# Patient Record
Sex: MALE | Race: WHITE | Employment: FULL TIME | URBAN - METROPOLITAN AREA
[De-identification: names, ages, dates, MRNs, and addresses within clinical notes are randomized per-mention and may not be internally consistent; named-entity substitution may affect disease eponyms.]

---

## 2018-06-29 ENCOUNTER — HOSPITAL ENCOUNTER (OUTPATIENT)
Dept: GENERAL RADIOLOGY | Age: 48
Discharge: HOME | End: 2018-07-01
Payer: COMMERCIAL

## 2018-06-29 ENCOUNTER — OFFICE VISIT (OUTPATIENT)
Dept: FAMILY MEDICINE CLINIC | Age: 48
End: 2018-06-29
Payer: COMMERCIAL

## 2018-06-29 DIAGNOSIS — M25.532 WRIST PAIN, LEFT: ICD-10-CM

## 2018-06-29 DIAGNOSIS — M79.642 HAND PAIN, LEFT: ICD-10-CM

## 2018-06-29 DIAGNOSIS — M75.41 IMPINGEMENT SYNDROME OF RIGHT SHOULDER: ICD-10-CM

## 2018-06-29 DIAGNOSIS — E55.9 VITAMIN D DEFICIENCY: ICD-10-CM

## 2018-06-29 DIAGNOSIS — M25.562 CHRONIC PAIN OF LEFT KNEE: Primary | ICD-10-CM

## 2018-06-29 DIAGNOSIS — G47.33 OSA (OBSTRUCTIVE SLEEP APNEA): ICD-10-CM

## 2018-06-29 DIAGNOSIS — E78.5 HYPERLIPIDEMIA WITH TARGET LDL LESS THAN 130: ICD-10-CM

## 2018-06-29 DIAGNOSIS — G89.29 CHRONIC PAIN OF LEFT KNEE: Primary | ICD-10-CM

## 2018-06-29 DIAGNOSIS — Z72.0 TOBACCO ABUSE: ICD-10-CM

## 2018-06-29 PROCEDURE — 99213 OFFICE O/P EST LOW 20 MIN: CPT | Performed by: FAMILY MEDICINE

## 2018-06-29 PROCEDURE — 73110 X-RAY EXAM OF WRIST: CPT

## 2018-06-29 PROCEDURE — 73130 X-RAY EXAM OF HAND: CPT

## 2018-06-29 RX ORDER — MELOXICAM 7.5 MG/1
7.5 TABLET ORAL DAILY
Qty: 30 TABLET | Refills: 3 | Status: SHIPPED | OUTPATIENT
Start: 2018-06-29 | End: 2019-02-25 | Stop reason: ALTCHOICE

## 2018-06-29 ASSESSMENT — PATIENT HEALTH QUESTIONNAIRE - PHQ9
1. LITTLE INTEREST OR PLEASURE IN DOING THINGS: 0
SUM OF ALL RESPONSES TO PHQ QUESTIONS 1-9: 0
SUM OF ALL RESPONSES TO PHQ9 QUESTIONS 1 & 2: 0
2. FEELING DOWN, DEPRESSED OR HOPELESS: 0

## 2018-07-06 DIAGNOSIS — T14.8XXA FRACTURE: Primary | ICD-10-CM

## 2018-07-10 VITALS
RESPIRATION RATE: 18 BRPM | DIASTOLIC BLOOD PRESSURE: 85 MMHG | TEMPERATURE: 97.3 F | WEIGHT: 201 LBS | HEIGHT: 71 IN | SYSTOLIC BLOOD PRESSURE: 135 MMHG | BODY MASS INDEX: 28.14 KG/M2 | HEART RATE: 79 BPM | OXYGEN SATURATION: 97 %

## 2018-07-10 NOTE — PROGRESS NOTES
hematuria  Musculoskeletal ROS: Pain  Neurological ROS: no TIA or stroke symptoms  Dermatological ROS: negative    Blood pressure 135/85, pulse 79, temperature 97.3 °F (36.3 °C), temperature source Temporal, resp. rate 18, height 5' 11\" (1.803 m), weight 201 lb (91.2 kg), SpO2 97 %. Physical Examination: General appearance - alert, well appearing, and in no distress  Mental status - alert, oriented to person, place, and time  Eyes - pupils equal and reactive, extraocular eye movements intact  Ears - bilateral TM's and external ear canals normal  Mouth - mucous membranes moist, pharynx normal without lesions  Neck - supple, no significant adenopathy  Lymphatics - no palpable lymphadenopathy, no hepatosplenomegaly  Chest - clear to auscultation, no wheezes, rales or rhonchi, symmetric air entry  Heart - normal rate, regular rhythm, normal S1, S2, no murmurs, rubs, clicks or gallops  Abdomen - soft, nontender, nondistended, no masses or organomegaly  Neurological - alert, oriented, normal speech, no focal findings or movement disorder noted  Musculoskeletal - no joint tenderness, deformity or swelling  Extremities - peripheral pulses normal, no pedal edema, no clubbing or cyanosis  Skin - normal coloration and turgor, no rashes, no suspicious skin lesions noted; Left wrist and hand x-ray noted with some degenerative changes    Assessment:     Diagnosis Orders   1. Chronic pain of left knee     2. Hyperlipidemia with target LDL less than 130     3. CRISTIAN (obstructive sleep apnea)     4. Tobacco abuse     5. Impingement syndrome of right shoulder     6. Vitamin D deficiency     7. Wrist pain, left  XR WRIST LEFT (MIN 3 VIEWS)   8.  Hand pain, left  XR HAND LEFT (MIN 3 VIEWS)       Plan:    Orders Placed This Encounter   Procedures    XR HAND LEFT (MIN 3 VIEWS)     Standing Status:   Future     Number of Occurrences:   1     Standing Expiration Date:   6/29/2019    XR WRIST LEFT (MIN 3 VIEWS)     Standing Status: Future     Number of Occurrences:   1     Standing Expiration Date:   6/29/2019       Outpatient Encounter Prescriptions as of 6/29/2018   Medication Sig Dispense Refill    meloxicam (MOBIC) 7.5 MG tablet Take 1 tablet by mouth daily 30 tablet 3    VOLTAREN 1 % GEL APPLY 4 GM  TO AFFECTED AREA(S)  FOUR TIMES DAILY 500 g 2    [DISCONTINUED] meloxicam (MOBIC) 15 MG tablet TAKE ONE TABLET BY MOUTH ONCE DAILY 30 tablet 2    [DISCONTINUED] meloxicam (MOBIC) 15 MG tablet TAKE ONE TABLET BY MOUTH EVERY DAY 30 tablet 3    [DISCONTINUED] clotrimazole-betamethasone (LOTRISONE) 1-0.05 % cream APPLY TOPICALLY TWICE DAILY 45 g 3    [DISCONTINUED] diclofenac sodium (VOLTAREN) 1 % GEL Apply 2 g topically 2 times daily. 1 Tube 3     No facility-administered encounter medications on file as of 6/29/2018. Keep ortho eval Regarding continued pain or if x-ray reading is abnormal  Stop tob  Watch bp  Recommend lifestyle modification. Discuss nsaid risk    Return if symptoms worsen or fail to improve. Patient education provided. They understand and agree with this course of treatment. They will return with new or worsening symptoms. Patient instructed to remain current with appropriate annual health maintenance.

## 2018-07-25 ENCOUNTER — HOSPITAL ENCOUNTER (OUTPATIENT)
Dept: GENERAL RADIOLOGY | Age: 48
Discharge: HOME | End: 2018-07-25

## 2018-07-25 DIAGNOSIS — S61.204A UNSPECIFIED OPEN WOUND OF RIGHT RING FINGER WITHOUT DAMAGE TO NAIL, INITIAL ENCOUNTER: ICD-10-CM

## 2019-02-25 ENCOUNTER — OFFICE VISIT (OUTPATIENT)
Dept: FAMILY MEDICINE CLINIC | Age: 49
End: 2019-02-25
Payer: COMMERCIAL

## 2019-02-25 DIAGNOSIS — G47.33 OSA (OBSTRUCTIVE SLEEP APNEA): ICD-10-CM

## 2019-02-25 DIAGNOSIS — E55.9 VITAMIN D DEFICIENCY: ICD-10-CM

## 2019-02-25 DIAGNOSIS — Z72.0 TOBACCO ABUSE: ICD-10-CM

## 2019-02-25 DIAGNOSIS — M25.532 WRIST PAIN, LEFT: ICD-10-CM

## 2019-02-25 DIAGNOSIS — G44.209 TENSION HEADACHE: ICD-10-CM

## 2019-02-25 DIAGNOSIS — E78.5 HYPERLIPIDEMIA WITH TARGET LDL LESS THAN 130: ICD-10-CM

## 2019-02-25 DIAGNOSIS — M75.41 IMPINGEMENT SYNDROME OF RIGHT SHOULDER: Primary | ICD-10-CM

## 2019-02-25 PROCEDURE — 99214 OFFICE O/P EST MOD 30 MIN: CPT | Performed by: FAMILY MEDICINE

## 2019-02-25 RX ORDER — NAPROXEN 500 MG/1
500 TABLET ORAL 2 TIMES DAILY WITH MEALS
Qty: 60 TABLET | Refills: 3 | Status: SHIPPED | OUTPATIENT
Start: 2019-02-25

## 2019-02-25 ASSESSMENT — PATIENT HEALTH QUESTIONNAIRE - PHQ9
1. LITTLE INTEREST OR PLEASURE IN DOING THINGS: 0
SUM OF ALL RESPONSES TO PHQ QUESTIONS 1-9: 0
SUM OF ALL RESPONSES TO PHQ QUESTIONS 1-9: 0
SUM OF ALL RESPONSES TO PHQ9 QUESTIONS 1 & 2: 0
2. FEELING DOWN, DEPRESSED OR HOPELESS: 0

## 2019-02-26 VITALS
RESPIRATION RATE: 16 BRPM | DIASTOLIC BLOOD PRESSURE: 80 MMHG | TEMPERATURE: 98.4 F | BODY MASS INDEX: 30.38 KG/M2 | HEIGHT: 71 IN | WEIGHT: 217 LBS | HEART RATE: 72 BPM | SYSTOLIC BLOOD PRESSURE: 135 MMHG

## 2019-03-25 ENCOUNTER — OFFICE VISIT (OUTPATIENT)
Dept: FAMILY MEDICINE CLINIC | Age: 49
End: 2019-03-25
Payer: COMMERCIAL

## 2019-03-25 VITALS
HEART RATE: 96 BPM | TEMPERATURE: 98.2 F | HEIGHT: 71 IN | OXYGEN SATURATION: 98 % | RESPIRATION RATE: 16 BRPM | WEIGHT: 217 LBS | BODY MASS INDEX: 30.38 KG/M2 | DIASTOLIC BLOOD PRESSURE: 84 MMHG | SYSTOLIC BLOOD PRESSURE: 140 MMHG

## 2019-03-25 DIAGNOSIS — G89.29 CHRONIC RIGHT SHOULDER PAIN: ICD-10-CM

## 2019-03-25 DIAGNOSIS — E78.5 HYPERLIPIDEMIA WITH TARGET LDL LESS THAN 130: Primary | ICD-10-CM

## 2019-03-25 DIAGNOSIS — E55.9 VITAMIN D DEFICIENCY: ICD-10-CM

## 2019-03-25 DIAGNOSIS — M25.511 CHRONIC RIGHT SHOULDER PAIN: ICD-10-CM

## 2019-03-25 PROCEDURE — 99213 OFFICE O/P EST LOW 20 MIN: CPT | Performed by: FAMILY MEDICINE

## 2019-04-02 ENCOUNTER — TELEPHONE (OUTPATIENT)
Dept: FAMILY MEDICINE CLINIC | Age: 49
End: 2019-04-02

## 2022-04-12 ENCOUNTER — HOSPITAL ENCOUNTER (EMERGENCY)
Age: 52
Discharge: HOME | End: 2022-04-12
Attending: EMERGENCY MEDICINE
Payer: COMMERCIAL

## 2022-04-12 ENCOUNTER — APPOINTMENT (OUTPATIENT)
Dept: CT IMAGING | Age: 52
End: 2022-04-12
Payer: COMMERCIAL

## 2022-04-12 VITALS
DIASTOLIC BLOOD PRESSURE: 95 MMHG | RESPIRATION RATE: 18 BRPM | HEIGHT: 72 IN | OXYGEN SATURATION: 97 % | WEIGHT: 212 LBS | SYSTOLIC BLOOD PRESSURE: 170 MMHG | HEART RATE: 104 BPM | TEMPERATURE: 98.1 F | BODY MASS INDEX: 28.71 KG/M2

## 2022-04-12 DIAGNOSIS — S09.90XA CLOSED HEAD INJURY, INITIAL ENCOUNTER: Primary | ICD-10-CM

## 2022-04-12 PROCEDURE — 99283 EMERGENCY DEPT VISIT LOW MDM: CPT

## 2022-04-12 PROCEDURE — 70450 CT HEAD/BRAIN W/O DYE: CPT

## 2022-04-12 ASSESSMENT — PAIN SCALES - GENERAL
PAINLEVEL_OUTOF10: 7
PAINLEVEL_OUTOF10: 5

## 2022-04-12 ASSESSMENT — PAIN DESCRIPTION - LOCATION
LOCATION: HEAD
LOCATION: HEAD

## 2022-04-12 ASSESSMENT — PAIN DESCRIPTION - DESCRIPTORS
DESCRIPTORS: ACHING
DESCRIPTORS: ACHING

## 2022-04-12 ASSESSMENT — PAIN DESCRIPTION - FREQUENCY: FREQUENCY: CONTINUOUS

## 2022-04-12 ASSESSMENT — PAIN SCALES - WONG BAKER: WONGBAKER_NUMERICALRESPONSE: 0

## 2022-04-12 ASSESSMENT — PAIN DESCRIPTION - PAIN TYPE
TYPE: ACUTE PAIN
TYPE: ACUTE PAIN

## 2022-04-12 ASSESSMENT — PAIN - FUNCTIONAL ASSESSMENT: PAIN_FUNCTIONAL_ASSESSMENT: 0-10

## 2022-04-12 NOTE — ED TRIAGE NOTES
Pt. Was injured at work by a metal band that circles steel, he states it hit his forehead, his nose. He denies LOC, reports some dizziness, mild nausea. Denies neck pain. He is unsure of blurred vision secondary to he \"needs new glasses,\"  There is no obvious swelling or injury to his face or head. He is a&o x4, NORTH.

## 2022-04-12 NOTE — ED NOTES
Rob Martin and Supervisor Ciara Mack both called regarding drug testing. Pt was injured 04/08/2022. Pt is past any time frame for drug testing per RN Supervisor. Pt advised.      Yahaira Phillips RN  04/12/22 [id number]

## 2022-04-12 NOTE — ED PROVIDER NOTES
CC/HPI: 54-year-old male that states while at work he was on doing a metal band when the metal band and the beams that they were holding came loose and struck him on his forehead. Patient states this happened last Thursday. The headache is gotten steadily worse. States he has mild nausea and some dizziness. Unsure of blurred vision secondary to he states he is needed new glasses for a while. No paresthesias no neck pain no chest pain or shortness of breath      VITALS/PMH/PSH: Reviewed per nurses notes    REVIEW OF SYSTEMS: As in chief complaint history of present illness, otherwise all other systems are reviewed and negative the total 10 systems reviewed    PHYSICAL EXAM:  GEN: Pt alert and oriented, no acute distress  HEENT:         Normocephalic/Atramatic -no outward signs of trauma to his forehead       PERRL, EOMI       EACs and TMs clear b/l no hemotympanums       Throat non-edematous. No erythema noted. No exudates noted. Moist membranes  NECK: Nontender, no signs of trauma, no lymphadenopathy  HEART: Reg S1/S2, without murmer, rub or gallop  LUNGS: Clear to auscultation bilaterally, respirations even and unlabored  MUSCULOSKELETAL/EXTREMITITES:  No signs of trauma, cyanosis or edema. LYMPH: no peripheral lympadenopathy noted  SKIN:  Warm & dry, no rash  NEUROLOGIC:  Alert and oriented. Speech clear. Cranial nerves II through XII grossly intact as are strength and sensation no focal or cerebellar deficits    Medical decision making/ED course;  Patient main stable throughout the course of his emergency department stay. CT scan of the head was obtained and interpreted by radiologist as showing no signs of acute intracranial abnormality. Clinical impression;  1) closed head injury    Disposition/plan;   Patient discharged home in stable condition given discharge instructions on head injury. Patient is to follow-up with occupational health.   Patient is to return for worsening or changes to

## 2024-08-30 PROBLEM — G44.209 TENSION HEADACHE: Status: ACTIVE | Noted: 2019-02-25

## 2024-08-30 PROBLEM — M25.511 CHRONIC RIGHT SHOULDER PAIN: Status: ACTIVE | Noted: 2019-03-25

## 2024-08-30 PROBLEM — S62.009A SCAPHOID FRACTURE, WRIST, CLOSED: Status: ACTIVE | Noted: 2024-08-30

## 2024-08-30 PROBLEM — S83.412A SPRAIN OF MEDIAL COLLATERAL LIGAMENT OF LEFT KNEE: Status: ACTIVE | Noted: 2024-08-30

## 2024-08-30 PROBLEM — M17.10 ARTHRITIS OF KNEE: Status: ACTIVE | Noted: 2024-08-30

## 2024-08-30 PROBLEM — G89.29 CHRONIC RIGHT SHOULDER PAIN: Status: ACTIVE | Noted: 2019-03-25

## 2024-08-30 RX ORDER — CELECOXIB 200 MG/1
CAPSULE ORAL 2 TIMES DAILY
COMMUNITY
End: 2024-09-05 | Stop reason: ALTCHOICE

## 2024-08-30 RX ORDER — MELOXICAM 15 MG/1
1 TABLET ORAL DAILY
COMMUNITY
Start: 2021-08-24 | End: 2024-09-05 | Stop reason: SDUPTHER

## 2024-09-05 ENCOUNTER — APPOINTMENT (OUTPATIENT)
Dept: PRIMARY CARE | Facility: CLINIC | Age: 54
End: 2024-09-05
Payer: COMMERCIAL

## 2024-09-05 VITALS
HEART RATE: 102 BPM | SYSTOLIC BLOOD PRESSURE: 146 MMHG | BODY MASS INDEX: 30.43 KG/M2 | DIASTOLIC BLOOD PRESSURE: 94 MMHG | OXYGEN SATURATION: 96 % | TEMPERATURE: 98.1 F | HEIGHT: 73 IN | RESPIRATION RATE: 16 BRPM | WEIGHT: 229.6 LBS

## 2024-09-05 DIAGNOSIS — M25.562 CHRONIC PAIN OF LEFT KNEE: ICD-10-CM

## 2024-09-05 DIAGNOSIS — G89.29 CHRONIC PAIN OF LEFT KNEE: ICD-10-CM

## 2024-09-05 DIAGNOSIS — M17.12 OSTEOARTHRITIS OF LEFT KNEE, UNSPECIFIED OSTEOARTHRITIS TYPE: Primary | ICD-10-CM

## 2024-09-05 DIAGNOSIS — R03.0 TEMPORARY HIGH BLOOD PRESSURE: ICD-10-CM

## 2024-09-05 DIAGNOSIS — E78.5 HYPERLIPIDEMIA WITH TARGET LDL LESS THAN 130: ICD-10-CM

## 2024-09-05 PROCEDURE — 3080F DIAST BP >= 90 MM HG: CPT | Performed by: FAMILY MEDICINE

## 2024-09-05 PROCEDURE — 3077F SYST BP >= 140 MM HG: CPT | Performed by: FAMILY MEDICINE

## 2024-09-05 PROCEDURE — 3008F BODY MASS INDEX DOCD: CPT | Performed by: FAMILY MEDICINE

## 2024-09-05 PROCEDURE — 99214 OFFICE O/P EST MOD 30 MIN: CPT | Performed by: FAMILY MEDICINE

## 2024-09-05 RX ORDER — CELECOXIB 200 MG/1
200 CAPSULE ORAL DAILY
Qty: 90 CAPSULE | Refills: 1 | Status: CANCELLED | OUTPATIENT
Start: 2024-09-05

## 2024-09-05 RX ORDER — MELOXICAM 15 MG/1
15 TABLET ORAL DAILY
Qty: 90 TABLET | Refills: 1 | Status: SHIPPED | OUTPATIENT
Start: 2024-09-05

## 2024-09-05 ASSESSMENT — PATIENT HEALTH QUESTIONNAIRE - PHQ9
SUM OF ALL RESPONSES TO PHQ9 QUESTIONS 1 AND 2: 0
1. LITTLE INTEREST OR PLEASURE IN DOING THINGS: NOT AT ALL
2. FEELING DOWN, DEPRESSED OR HOPELESS: NOT AT ALL

## 2024-09-05 ASSESSMENT — ENCOUNTER SYMPTOMS
LOSS OF SENSATION IN FEET: 0
OCCASIONAL FEELINGS OF UNSTEADINESS: 0
DEPRESSION: 0

## 2024-09-05 NOTE — PROGRESS NOTES
"Subjective   Patient ID:  Rashaun Klein is a 53 y.o. male patient who presents today for Hyperlipidemia and Arthritis (Left knee pain)    Hyperlipidemia: States his clinic at work has his previous blood work.     Arthritis: Patient complains of arthritis pain in the left knee. The pain has been going on for awhile now. Has not taken antiinflammatory medication. He has previously taken antiinflammatory medications meloxicam and Celebrex. Patient stated the meloxicam worked best previously. He has been using Voltaren gel at home.     Blood pressure is high today in clinic. He states this is not usually normal, but has recently been having a short fuse due to previous medication.       The patient denies having the following symptoms: chest pain, chest pressure, fever, chills, N/V/D, constipation, dizziness, headaches, SOB.      Objective   Vitals:  BP (!) 146/94   Pulse 102   Temp 36.7 °C (98.1 °F)   Resp 16   Ht 1.854 m (6' 1\")   Wt 104 kg (229 lb 9.6 oz)   SpO2 96%   BMI 30.29 kg/m²     Physical Exam  Vitals reviewed.   Constitutional:       Appearance: Normal appearance.   Neck:      Vascular: No carotid bruit.   Cardiovascular:      Rate and Rhythm: Normal rate and regular rhythm.      Pulses: Normal pulses.      Heart sounds: Normal heart sounds.   Pulmonary:      Effort: Pulmonary effort is normal. No respiratory distress.      Breath sounds: Normal breath sounds. No wheezing.   Abdominal:      General: There is no distension.      Palpations: Abdomen is soft. There is no mass.      Tenderness: There is no abdominal tenderness. There is no right CVA tenderness, left CVA tenderness, guarding or rebound.   Musculoskeletal:      Cervical back: Normal range of motion and neck supple. No rigidity.      Right lower leg: No edema.      Left lower leg: No edema.      Comments: Left knee is tender and in a brace.    Lymphadenopathy:      Cervical: No cervical adenopathy.   Neurological:      Mental Status: He is " alert.       Reviewed labs from employer sponsored exam.      Assessment/Plan   Problem List Items Addressed This Visit       Hyperlipidemia with target LDL less than 130    Current Assessment & Plan     Continue eating healthy, will continue following with them for your lab testing.         Relevant Orders    Lipid Panel    Knee pain, left    Current Assessment & Plan     Likely due to arthritis, see left knee arthritis.         Osteoarthritis of left knee - Primary    Current Assessment & Plan     This condition is present and symptomatic, will need treatment.  Meloxicam 15 mg daily.  Patient may use Tylenol if he needs it.  Patient should not take celecoxib because he is on meloxicam.  Aleve and ibuprofen should not be used either.         Relevant Medications    meloxicam (Mobic) 15 mg tablet     Other Visit Diagnoses       Temporary high blood pressure        Blood pressure elevated in office today, patient will monitor blood pressure and see MA in near future for blood pressure recheck.    Relevant Orders    Follow Up In [facility] - PCP - Established    CBC and Auto Differential    Comprehensive Metabolic Panel    Magnesium                Follow up in: One month or sooner if needed with labs today.    Scribe Attestation  By signing my name below, IIda Scribe   attest that this documentation has been prepared under the direction and in the presence of Chaim Chow MD.

## 2024-09-08 PROBLEM — M17.12 OSTEOARTHRITIS OF LEFT KNEE: Status: ACTIVE | Noted: 2024-09-08

## 2024-09-08 NOTE — ASSESSMENT & PLAN NOTE
This condition is present and symptomatic, will need treatment.  Meloxicam 15 mg daily.  Patient may use Tylenol if he needs it.  Patient should not take celecoxib because he is on meloxicam.  Aleve and ibuprofen should not be used either.

## 2024-10-08 ENCOUNTER — APPOINTMENT (OUTPATIENT)
Dept: PRIMARY CARE | Facility: CLINIC | Age: 54
End: 2024-10-08
Payer: COMMERCIAL

## 2024-10-08 VITALS
HEART RATE: 99 BPM | DIASTOLIC BLOOD PRESSURE: 84 MMHG | SYSTOLIC BLOOD PRESSURE: 138 MMHG | TEMPERATURE: 97.4 F | OXYGEN SATURATION: 97 % | WEIGHT: 234.2 LBS | RESPIRATION RATE: 16 BRPM | HEIGHT: 73 IN | BODY MASS INDEX: 31.04 KG/M2

## 2024-10-08 DIAGNOSIS — I10 PRIMARY HYPERTENSION: Primary | ICD-10-CM

## 2024-10-08 DIAGNOSIS — M19.90 ARTHRITIS: ICD-10-CM

## 2024-10-08 PROCEDURE — 3075F SYST BP GE 130 - 139MM HG: CPT | Performed by: FAMILY MEDICINE

## 2024-10-08 PROCEDURE — 3079F DIAST BP 80-89 MM HG: CPT | Performed by: FAMILY MEDICINE

## 2024-10-08 PROCEDURE — 3008F BODY MASS INDEX DOCD: CPT | Performed by: FAMILY MEDICINE

## 2024-10-08 PROCEDURE — 99213 OFFICE O/P EST LOW 20 MIN: CPT | Performed by: FAMILY MEDICINE

## 2024-10-08 RX ORDER — HYDROCHLOROTHIAZIDE 12.5 MG/1
12.5 TABLET ORAL DAILY
Qty: 30 TABLET | Refills: 3 | Status: SHIPPED | OUTPATIENT
Start: 2024-10-08 | End: 2025-02-05

## 2024-10-08 ASSESSMENT — ENCOUNTER SYMPTOMS
OCCASIONAL FEELINGS OF UNSTEADINESS: 0
DEPRESSION: 0
LOSS OF SENSATION IN FEET: 0

## 2024-10-08 NOTE — PROGRESS NOTES
"Subjective   Patient ID:  Rashaun Klein is a 53 y.o. male patient who presents today for Hypertension (Pt was taking Lisinopril and it made him angry. He has stopped taking it. He is agreeable he needs a medication.), Osteoarthritis, and Obesity    Hypertension: blood pressure today is elevated. He discontinued Lisinopril due to experiencing increased irritability while on the medication.  The patient's tongue swelled up and emergency room thought that it was quite possibly due to lisinopril.  Prescribed hydrochlorothiazide 12.5 mg. He is advised to monitor blood pressure at home.     Osteoarthritis: Patient is moving around a lot better with less joint pain.  Meloxicam seems to be helping considerably.      The patient denies having the following symptoms: chest pain, chest pressure, fever, chills, N/V/D, constipation, dizziness, headaches, SOB.    Objective   Vitals:  /84   Pulse 99   Temp 36.3 °C (97.4 °F)   Resp 16   Ht 1.854 m (6' 1\")   Wt 106 kg (234 lb 3.2 oz)   SpO2 97%   BMI 30.90 kg/m²     Physical Exam  Vitals reviewed.   Constitutional:       Appearance: Normal appearance.   Cardiovascular:      Rate and Rhythm: Normal rate and regular rhythm.      Pulses: Normal pulses.      Heart sounds: Normal heart sounds.   Pulmonary:      Effort: Pulmonary effort is normal.      Breath sounds: Normal breath sounds.   Abdominal:      General: Abdomen is flat.      Palpations: Abdomen is soft.   Musculoskeletal:      Cervical back: Normal range of motion and neck supple.   Neurological:      Mental Status: He is alert.             Assessment/Plan   Problem List Items Addressed This Visit       Primary hypertension - Primary    Current Assessment & Plan     Present, will need to change therapy as lisinopril just to be discontinued, start hydrochlorothiazide 12.5 mg daily.         Relevant Medications    hydroCHLOROthiazide (Microzide) 12.5 mg tablet    Arthritis    Current Assessment & Plan     This " condition is well controlled, continue with current medications.  Continue with meloxicam 15 mg daily.                Follow up in: 3 months or sooner if needed without labs prior. Patient will bring blood work from employer.    Scribe Attestation  By signing my name below, ILeonor , Scribe attest that this documentation has been prepared under the direction and in the presence of Chaim Chow MD.

## 2024-10-09 NOTE — ASSESSMENT & PLAN NOTE
Present, will need to change therapy as lisinopril just to be discontinued, start hydrochlorothiazide 12.5 mg daily.

## 2024-10-09 NOTE — ASSESSMENT & PLAN NOTE
This condition is well controlled, continue with current medications.  Continue with meloxicam 15 mg daily.

## 2024-12-03 ENCOUNTER — TELEPHONE (OUTPATIENT)
Dept: PRIMARY CARE | Facility: CLINIC | Age: 54
End: 2024-12-03
Payer: COMMERCIAL

## 2024-12-03 DIAGNOSIS — M17.12 OSTEOARTHRITIS OF LEFT KNEE, UNSPECIFIED OSTEOARTHRITIS TYPE: ICD-10-CM

## 2024-12-03 NOTE — TELEPHONE ENCOUNTER
Received multiple faxes from [facility] requesting the patients meloxicam. Pharmacy not listed in patients chart. LVM asking patient to call back and confirm

## 2024-12-03 NOTE — TELEPHONE ENCOUNTER
GAY CALLED IN TO LET US KNOW THAT HE DID HAVE TO HAVE HIS MEDICATIONS SWITCHED TO A MAIL ORDER FOR THEM TO BE COVERED BUT HE STATED THAT HE ALREADY PAID FOR HIS MEDICATIONS AND THEY SHOULD BE IN THE MAIL ALREADY. DID SAY THE NAME OF HIS PHARMACY IS [facility]. NOT SURE IF ITS AFFIL[facility]ATED [facility] PLEASE AD[facility]ISE?     [facility]   [phone]

## 2024-12-04 RX ORDER — MELOXICAM 15 MG/1
15 TABLET ORAL DAILY
Qty: 90 TABLET | Refills: 1 | Status: SHIPPED | OUTPATIENT
Start: 2024-12-04

## 2024-12-04 NOTE — TELEPHONE ENCOUNTER
The pharmacy is called [facility] and it is associated with [facility]. Chart update with pharmacy

## 2025-01-09 ENCOUNTER — APPOINTMENT (OUTPATIENT)
Dept: PRIMARY CARE | Facility: CLINIC | Age: 55
End: 2025-01-09
Payer: COMMERCIAL

## 2025-01-09 VITALS
WEIGHT: 234 LBS | TEMPERATURE: 97.4 F | DIASTOLIC BLOOD PRESSURE: 90 MMHG | HEART RATE: 104 BPM | BODY MASS INDEX: 30.87 KG/M2 | RESPIRATION RATE: 16 BRPM | OXYGEN SATURATION: 97 % | SYSTOLIC BLOOD PRESSURE: 160 MMHG

## 2025-01-09 DIAGNOSIS — M17.32 POST-TRAUMATIC OSTEOARTHRITIS OF LEFT KNEE: ICD-10-CM

## 2025-01-09 DIAGNOSIS — I10 PRIMARY HYPERTENSION: Primary | ICD-10-CM

## 2025-01-09 PROCEDURE — 3077F SYST BP >= 140 MM HG: CPT | Performed by: FAMILY MEDICINE

## 2025-01-09 PROCEDURE — 99214 OFFICE O/P EST MOD 30 MIN: CPT | Performed by: FAMILY MEDICINE

## 2025-01-09 PROCEDURE — 3080F DIAST BP >= 90 MM HG: CPT | Performed by: FAMILY MEDICINE

## 2025-01-09 RX ORDER — AMLODIPINE BESYLATE 5 MG/1
5 TABLET ORAL DAILY
Qty: 30 TABLET | Refills: 5 | Status: SHIPPED | OUTPATIENT
Start: 2025-01-09 | End: 2025-07-08

## 2025-01-09 NOTE — PROGRESS NOTES
Subjective    Patient ID: Rashaun Klein is a 54 y.o. male who presents for Hypertension, Hyperlipidemia, oseoarthritis, and Obesity.     Hypertension: The patient is here for an evaluation of elevated blood pressure. The patient is trying to follow a low-salt diet. He is adherent to a low salt diet. Blood pressure is well controlled at home. The patient has not been hospitalized for this in the last 6 months. The patient is compliant with medications. Patient denies any side effects to the medications. Issues with blood pressure medication side effects. Blood pressure still not lowered.    Hyperlipidemia: The patient is presenting today for a follow up of hyperlipidemia. The patient has not been hospitalized for this in the last 6 months. The patient is compliant with medications. Patient denies any side effects to the medications.     Oseoarthritis: Patient states he is still having issues with his left knee. Wife has mentioned how he has started to walk more on his right knee and is pushing for him to go to the doctor. He has had prior surgery to fix a torn meniscus, and bone spurs with tendon popping. 4-5 years ago he fell funny and has continued issues with his knee since, it has just now gotten to where it has affected his daily living.            The patient denies having the following symptoms: chest pain, chest pressure, fever, chills, N/V/D, constipation, dizziness, headaches, SOB.    Objective   Vitals:  /90   Pulse 104   Temp 36.3 °C (97.4 °F)   Resp 16   Wt 106 kg (234 lb)   SpO2 97%   BMI 30.87 kg/m²     Physical Exam  Vitals reviewed.   Constitutional:       Appearance: Normal appearance.   Cardiovascular:      Rate and Rhythm: Normal rate and regular rhythm.      Pulses: Normal pulses.      Heart sounds: Normal heart sounds.   Pulmonary:      Effort: Pulmonary effort is normal.      Breath sounds: Normal breath sounds.   Abdominal:      General: Abdomen is flat.      Palpations: Abdomen  is soft.   Musculoskeletal:      Cervical back: Normal range of motion and neck supple.      Comments: Tenderness medial aspect of the left knee   Neurological:      Mental Status: He is alert.            Labs reviewed from :              CMP, CBC, Lipid      Assessment/Plan   Problem List Items Addressed This Visit          Cardiac and Vasculature    Primary hypertension - Primary    Relevant Medications    amLODIPine (Norvasc) 5 mg tablet    Other Relevant Orders    CBC and Auto Differential    Comprehensive Metabolic Panel    Lipid Panel    Magnesium    Follow Up In [facility] - PCP - Established       Musculoskeletal and Injuries    Osteoarthritis of left knee    Relevant Orders    Referral to Orthopaedic Surgery       Follow up in: 1 month(s) or sooner if needed with labs prior.     Scribe Attestation  By signing my name below, IIda Scribe   attest that this documentation has been prepared under the direction and in the presence of Chaim Chow MD.

## 2025-01-24 ENCOUNTER — HOSPITAL ENCOUNTER (OUTPATIENT)
Dept: RADIOLOGY | Facility: HOSPITAL | Age: 55
Discharge: HOME | End: 2025-01-24
Payer: COMMERCIAL

## 2025-01-24 ENCOUNTER — APPOINTMENT (OUTPATIENT)
Dept: ORTHOPEDIC SURGERY | Facility: CLINIC | Age: 55
End: 2025-01-24
Payer: COMMERCIAL

## 2025-01-24 DIAGNOSIS — M17.32 POST-TRAUMATIC OSTEOARTHRITIS OF LEFT KNEE: ICD-10-CM

## 2025-01-24 DIAGNOSIS — M17.32 POST-TRAUMATIC OSTEOARTHRITIS OF LEFT KNEE: Primary | ICD-10-CM

## 2025-01-24 DIAGNOSIS — M25.562 LEFT KNEE PAIN, UNSPECIFIED CHRONICITY: ICD-10-CM

## 2025-01-24 PROCEDURE — 73564 X-RAY EXAM KNEE 4 OR MORE: CPT | Mod: LT

## 2025-01-24 NOTE — PROGRESS NOTES
Chief Complaint   Patient presents with    Left Knee - New Patient Visit, Pain     LEFT KNEE PAIN  X-RAYS TODAY       History of Present Illness  Patient presents with left knee pain for several years.  The patient localizes the pain diffusely.  Recently there has been concern for falls and instability.  There is increasing difficulty with activities of daily living and significant disability related to the knee pain.  The patient endorses the following failed non-operative treatments: Activity modification cortisone injections anti-inflammatories with little to no relief.   There is increasing frustration with persistent pain and swelling and decreasing distance of ambulation.  Pain is moderate, achy, diffuse.  Better with rest, worse with activity.      Patient has reverted to drinking at night to help with the pain to help him sleep.  Prior history of ACL reconstruction many years ago, meniscus cleanout many years ago as well.  He has debilitating pain about his knee.  Unable to take any time off work as she is the sole household pregnant and last time also/because prior surgery     Review of Systems   GENERAL: Negative for malaise, significant weight loss, fever  MUSCULOSKELETAL: see HPI  NEURO:  Negative     Exam  Left knee:  Skin healthy and intact  No gross swelling or ecchymosis  Alignment: Moderate varus      effusion: Moderate       ROM: 10-90  Crepitance with range of motion  No pain with internal rotation of the hip  Tenderness to palpation over medial and lateral joint line and with patellar compression      No laxity to valgus stress  No laxity to varus stress  Negative Lachman´s test  Negative posterior drawer test  Mild pain with Loyda´s test  Neurovascular exam normal distally  2+ DP pulse and good cap refill     Radiographs  4  Views left knee: There is severe joint space narrowing about the medial compartment of the knee there is osteophytosis Roxane is subchondral cystic change severely  distended with severe tricompartmental knee arthritis large osteophytes about the posterior aspect of the knee as well as about the anterior aspect prior retained orthopedic hardware from ACL reconstruction  Procedures     Assessment  Patient with severe left tricompartmental knee osteoarthritis     Plan  We discussed with the patient the diagnosis of degenerative joint disease of the knee.  We reviewed an evidence-based approach to osteoarthritis of the knee.  We strongly encouraged low-impact aerobic activity and non-opioid analgesics.     We discussed temporary pain relief with corticosteroid injections and the associated risks.  We also discussed the conflicting evidence regarding viscosupplementation and potential long-term risks with NSAID´s.  We reviewed the role of bracing for instability and physical therapy for atrophy and gait abnormalities.  The patient elected for gel injection therapy.  Also provide prescription for medial  brace  Patient has attempted multiple forms conservative treatment to include those listed above for at least several years duration.  Will submit for approval for gel injection therapy.  May benefit from pain management referral if these fail to provide relief  Patient was prescribed a medial  brace for medial compartment DJD.  Physical examination positive for mild laxity with valgus stress.  The patient is ambulatory with or without aid: But, has weakness, instability and/or deformity of their knee which requires stabilization from this orthosis to improve their function.

## 2025-02-01 LAB
ALBUMIN SERPL-MCNC: 5 G/DL (ref 3.6–5.1)
ALP SERPL-CCNC: 89 U/L (ref 35–144)
ALT SERPL-CCNC: 26 U/L (ref 9–46)
ANION GAP SERPL CALCULATED.4IONS-SCNC: 11 MMOL/L (CALC) (ref 7–17)
AST SERPL-CCNC: 19 U/L (ref 10–35)
BASOPHILS # BLD AUTO: 43 CELLS/UL (ref 0–200)
BASOPHILS NFR BLD AUTO: 0.5 %
BILIRUB SERPL-MCNC: 0.9 MG/DL (ref 0.2–1.2)
BUN SERPL-MCNC: 18 MG/DL (ref 7–25)
CALCIUM SERPL-MCNC: 9.5 MG/DL (ref 8.6–10.3)
CHLORIDE SERPL-SCNC: 104 MMOL/L (ref 98–110)
CHOLEST SERPL-MCNC: 248 MG/DL
CHOLEST/HDLC SERPL: 3.8 (CALC)
CO2 SERPL-SCNC: 25 MMOL/L (ref 20–32)
CREAT SERPL-MCNC: 1.08 MG/DL (ref 0.7–1.3)
EGFRCR SERPLBLD CKD-EPI 2021: 81 ML/MIN/1.73M2
EOSINOPHIL # BLD AUTO: 361 CELLS/UL (ref 15–500)
EOSINOPHIL NFR BLD AUTO: 4.2 %
ERYTHROCYTE [DISTWIDTH] IN BLOOD BY AUTOMATED COUNT: 12.7 % (ref 11–15)
GLUCOSE SERPL-MCNC: 98 MG/DL (ref 65–99)
HCT VFR BLD AUTO: 46.9 % (ref 38.5–50)
HDLC SERPL-MCNC: 66 MG/DL
HGB BLD-MCNC: 15.5 G/DL (ref 13.2–17.1)
LDLC SERPL CALC-MCNC: 150 MG/DL (CALC)
LYMPHOCYTES # BLD AUTO: 2038 CELLS/UL (ref 850–3900)
LYMPHOCYTES NFR BLD AUTO: 23.7 %
MAGNESIUM SERPL-MCNC: 2.4 MG/DL (ref 1.5–2.5)
MCH RBC QN AUTO: 32 PG (ref 27–33)
MCHC RBC AUTO-ENTMCNC: 33 G/DL (ref 32–36)
MCV RBC AUTO: 96.9 FL (ref 80–100)
MONOCYTES # BLD AUTO: 740 CELLS/UL (ref 200–950)
MONOCYTES NFR BLD AUTO: 8.6 %
NEUTROPHILS # BLD AUTO: 5418 CELLS/UL (ref 1500–7800)
NEUTROPHILS NFR BLD AUTO: 63 %
NONHDLC SERPL-MCNC: 182 MG/DL (CALC)
PLATELET # BLD AUTO: 344 THOUSAND/UL (ref 140–400)
PMV BLD REES-ECKER: 10.6 FL (ref 7.5–12.5)
POTASSIUM SERPL-SCNC: 4.9 MMOL/L (ref 3.5–5.3)
PROT SERPL-MCNC: 7.8 G/DL (ref 6.1–8.1)
RBC # BLD AUTO: 4.84 MILLION/UL (ref 4.2–5.8)
SODIUM SERPL-SCNC: 140 MMOL/L (ref 135–146)
TRIGL SERPL-MCNC: 185 MG/DL
WBC # BLD AUTO: 8.6 THOUSAND/UL (ref 3.8–10.8)

## 2025-02-14 ENCOUNTER — APPOINTMENT (OUTPATIENT)
Dept: PRIMARY CARE | Facility: CLINIC | Age: 55
End: 2025-02-14
Payer: COMMERCIAL

## 2025-02-14 VITALS
WEIGHT: 236 LBS | DIASTOLIC BLOOD PRESSURE: 70 MMHG | OXYGEN SATURATION: 97 % | SYSTOLIC BLOOD PRESSURE: 138 MMHG | RESPIRATION RATE: 16 BRPM | HEART RATE: 106 BPM | BODY MASS INDEX: 31.14 KG/M2

## 2025-02-14 DIAGNOSIS — E78.5 HYPERLIPIDEMIA WITH TARGET LDL LESS THAN 130: ICD-10-CM

## 2025-02-14 DIAGNOSIS — I10 PRIMARY HYPERTENSION: Primary | ICD-10-CM

## 2025-02-14 DIAGNOSIS — M17.12 PRIMARY OSTEOARTHRITIS OF LEFT KNEE: ICD-10-CM

## 2025-02-14 PROCEDURE — 3075F SYST BP GE 130 - 139MM HG: CPT | Performed by: FAMILY MEDICINE

## 2025-02-14 PROCEDURE — 99214 OFFICE O/P EST MOD 30 MIN: CPT | Performed by: FAMILY MEDICINE

## 2025-02-14 PROCEDURE — 3078F DIAST BP <80 MM HG: CPT | Performed by: FAMILY MEDICINE

## 2025-02-14 RX ORDER — AMLODIPINE BESYLATE 5 MG/1
5 TABLET ORAL DAILY
Qty: 90 TABLET | Refills: 1 | Status: SHIPPED | OUTPATIENT
Start: 2025-02-14 | End: 2025-08-13

## 2025-02-14 NOTE — PROGRESS NOTES
Subjective   Patient ID:  Rashaun Klein is a 54 y.o. male patient who presents today for Hypertension, Hyperlipidemia, Osteoarthritis, and Obesity.    Hypertension: Blood pressure today is elevated.     Hyperlipidemia: Cholesterol and triglycerides are elevated.     All other blood work normal.     Osteoarthritis: Patient reports left knee pain. He spends long hours on his feet at work. He states that there is no cartilage in the knee, resulting in bone-on-bone contact. He feels constantly sore.     Obesity: Patient is physically active at work. He tries to remain active at home and in the garden. He is making an effort to eat healthy meals.       Objective   Vitals:  /70   Pulse 106   Resp 16   Wt 107 kg (236 lb)   SpO2 97%   BMI 31.14 kg/m²       Physical Exam  Vitals reviewed.   Constitutional:       Appearance: Normal appearance. He is obese.   Cardiovascular:      Rate and Rhythm: Normal rate and regular rhythm.      Pulses: Normal pulses.      Heart sounds: Normal heart sounds.   Pulmonary:      Effort: Pulmonary effort is normal.      Breath sounds: Normal breath sounds.   Abdominal:      General: Abdomen is flat.      Palpations: Abdomen is soft.   Musculoskeletal:      Cervical back: Normal range of motion and neck supple.   Neurological:      Mental Status: He is alert.         Labs reviewed from:  1/31/25   CMP, CBC, Lipid      Assessment/Plan   Problem List Items Addressed This Visit       Hyperlipidemia with target LDL less than 130    Relevant Orders    Lipid Panel    Osteoarthritis of left knee    Current Assessment & Plan      Is stable, continue with current treatment.           Primary hypertension - Primary    Current Assessment & Plan      Is stable, continue with current treatment.           Relevant Medications    amLODIPine (Norvasc) 5 mg tablet    Other Relevant Orders    Follow Up In [facility] - PCP - Established    CBC and Auto Differential    Comprehensive Metabolic  Panel    Magnesium       Follow up in: 4 month(s) or sooner if needed with labs prior.       Scribe Attestation  By signing my name below, I, Leonor Lo , Scribcaroline attest that this documentation has been prepared under the direction and in the presence of Chaim Chow MD.

## 2025-02-20 ENCOUNTER — TELEPHONE (OUTPATIENT)
Dept: ORTHOPEDIC SURGERY | Facility: CLINIC | Age: 55
End: 2025-02-20
Payer: COMMERCIAL

## 2025-02-20 NOTE — TELEPHONE ENCOUNTER
2/20/25 -  medial OA brace approved $1.220.00    Coinsurance 80/20  Deductible 1250 / met 310.04  Max oop 3,000 /met 316.62  **no auth is required on this brace  Reference #[id number].    Left knee  Thigh 19 1/2  Calf 16 1/2  Knee offset -8  Knee width 124    Spoke to patient same day - explained cost of brace.   Pt is scheduled for gel injections and would like to hold off on the brace for now to see how the gel's work and to see if more of the deductible is met.   DME will hold onto his paperwork and re-precert brace April 1st 2025.  All questions answered at this time

## 2025-02-28 ENCOUNTER — APPOINTMENT (OUTPATIENT)
Dept: ORTHOPEDIC SURGERY | Facility: CLINIC | Age: 55
End: 2025-02-28
Payer: COMMERCIAL

## 2025-02-28 DIAGNOSIS — M17.32 POST-TRAUMATIC OSTEOARTHRITIS OF LEFT KNEE: Primary | ICD-10-CM

## 2025-02-28 NOTE — PROGRESS NOTES
History of Present Illness  Patient returns today for an injection with viscosupplementation. The patient endorsing knee pain refractory to cortisone injections and oral medications     Review of Systems   GENERAL: Negative for malaise, significant weight loss, fever  MUSCULOSKELETAL: see HPI  NEURO:  Negative     Exam  Trace effusion  Tenderness over medial and lateral joint line     Assessment:  Patient with known osteoarthritis of the knee     Plan:  Visco injection provided, patient tolerated it well. See procedure note.     Injection performed as detailed in the procedure note below.      PROCEDURE NOTE:  Physician:Yonathan Zabala III, MD     Prior to the procedure, the patient's allergies were reviewed. The procedure site was marked and time out performed. The procedure team checked for proper functioning of devices and supplies to be used for the procedure. The procedure team reviewed the relevant diagnostic tests pertinent to the procedure. The risks, benefits and anticipated outcomes of the procedure, the risks and benefits of the alternatives to the procedure, and the roles and tasks of the personnel to be involved were discussed with the patient, the patient verbalized understanding and consenting to the procedure.        Procedure details:  The injection site was prepped in the usual sterile manner.   Ethyl chloride mist spray was used to numb the skin.    Gelsyn was injected into the left knee.  After the injection, a bandage was placed over the injection site.   The patient tolerated the procedure well with no adverse effects.   Post-injection instructions were reviewed with the patient and the patient voiced understanding of these instructions.        L Inj/Asp: L knee on 2/28/2025 12:34 PM  Indications: pain  Details: 21 G needle, anterolateral approach  Medications: 16.8 mg sodium hyaluronate 16.8 mg/2 mL  Outcome: tolerated well, no immediate complications  Procedure, treatment alternatives, risks  and benefits explained, specific risks discussed. Consent was given by the patient. Immediately prior to procedure a time out was called to verify the correct patient, procedure, equipment, support staff and site/side marked as required. Patient was prepped and draped in the usual sterile fashion.

## 2025-03-07 ENCOUNTER — APPOINTMENT (OUTPATIENT)
Dept: ORTHOPEDIC SURGERY | Facility: CLINIC | Age: 55
End: 2025-03-07
Payer: COMMERCIAL

## 2025-03-07 DIAGNOSIS — M17.32 POST-TRAUMATIC OSTEOARTHRITIS OF LEFT KNEE: Primary | ICD-10-CM

## 2025-03-07 NOTE — PROGRESS NOTES
History of Present Illness  Patient returns today for an injection with viscosupplementation. The patient endorsing knee pain refractory to cortisone injections and oral medications     Review of Systems   GENERAL: Negative for malaise, significant weight loss, fever  MUSCULOSKELETAL: see HPI  NEURO:  Negative     Exam  Trace effusion  Tenderness over medial and lateral joint line     Assessment:  Patient with known osteoarthritis of the knee     Plan:  Visco injection provided, patient tolerated it well. See procedure note.     Injection performed as detailed in the procedure note below.      PROCEDURE NOTE:  Physician:Yonathan Zabala III, MD     Prior to the procedure, the patient's allergies were reviewed. The procedure site was marked and time out performed. The procedure team checked for proper functioning of devices and supplies to be used for the procedure. The procedure team reviewed the relevant diagnostic tests pertinent to the procedure. The risks, benefits and anticipated outcomes of the procedure, the risks and benefits of the alternatives to the procedure, and the roles and tasks of the personnel to be involved were discussed with the patient, the patient verbalized understanding and consenting to the procedure.        Procedure details:  The injection site was prepped in the usual sterile manner.   Ethyl chloride mist spray was used to numb the skin.    Gelsyn was injected into the left knee.  After the injection, a bandage was placed over the injection site.   The patient tolerated the procedure well with no adverse effects.   Post-injection instructions were reviewed with the patient and the patient voiced understanding of these instructions.        L Inj/Asp: L knee on 3/7/2025 12:11 PM  Indications: pain  Details: 21 G needle, anterolateral approach  Medications: 16.8 mg sodium hyaluronate 16.8 mg/2 mL  Outcome: tolerated well, no immediate complications  Procedure, treatment alternatives, risks  and benefits explained, specific risks discussed. Consent was given by the patient. Immediately prior to procedure a time out was called to verify the correct patient, procedure, equipment, support staff and site/side marked as required. Patient was prepped and draped in the usual sterile fashion.

## 2025-03-13 ENCOUNTER — APPOINTMENT (OUTPATIENT)
Dept: ORTHOPEDIC SURGERY | Facility: CLINIC | Age: 55
End: 2025-03-13
Payer: COMMERCIAL

## 2025-03-13 DIAGNOSIS — M17.32 POST-TRAUMATIC OSTEOARTHRITIS OF LEFT KNEE: Primary | ICD-10-CM

## 2025-03-13 NOTE — PROGRESS NOTES
History of Present Illness  Patient returns today for an injection with viscosupplementation. The patient endorsing knee pain refractory to cortisone injections and oral medications     Review of Systems   GENERAL: Negative for malaise, significant weight loss, fever  MUSCULOSKELETAL: see HPI  NEURO:  Negative     Exam  Trace effusion  Tenderness over medial and lateral joint line     Assessment:  Patient with known osteoarthritis of the knee     Plan:  Visco injection provided, patient tolerated it well. See procedure note.     Injection performed as detailed in the procedure note below.      PROCEDURE NOTE:  Physician:Yonathan Zabala III, MD     Prior to the procedure, the patient's allergies were reviewed. The procedure site was marked and time out performed. The procedure team checked for proper functioning of devices and supplies to be used for the procedure. The procedure team reviewed the relevant diagnostic tests pertinent to the procedure. The risks, benefits and anticipated outcomes of the procedure, the risks and benefits of the alternatives to the procedure, and the roles and tasks of the personnel to be involved were discussed with the patient, the patient verbalized understanding and consenting to the procedure.        Procedure details:  The injection site was prepped in the usual sterile manner.   Ethyl chloride mist spray was used to numb the skin.    Gelsyn was injected into the left knee.  After the injection, a bandage was placed over the injection site.   The patient tolerated the procedure well with no adverse effects.   Post-injection instructions were reviewed with the patient and the patient voiced understanding of these instructions.        L Inj/Asp: L knee on 3/13/2025 3:40 PM  Indications: pain  Details: 21 G needle, anterolateral approach  Medications: 16.8 mg sodium hyaluronate 16.8 mg/2 mL  Outcome: tolerated well, no immediate complications  Procedure, treatment alternatives, risks  and benefits explained, specific risks discussed. Consent was given by the patient. Immediately prior to procedure a time out was called to verify the correct patient, procedure, equipment, support staff and site/side marked as required. Patient was prepped and draped in the usual sterile fashion.

## 2025-05-09 ENCOUNTER — TELEPHONE (OUTPATIENT)
Dept: ORTHOPEDIC SURGERY | Facility: CLINIC | Age: 55
End: 2025-05-09
Payer: COMMERCIAL

## 2025-05-09 NOTE — TELEPHONE ENCOUNTER
Called PT to ask if OT would still like the [employer] OA Brace. Insurance is still showing the same met amount back in 2/20/25. PT has received Gel shots and has been doing fine. PT will like to discontinue with brace at this time.    If PT changes their mind, all information is secured in [employer]

## 2025-05-30 DIAGNOSIS — I10 PRIMARY HYPERTENSION: ICD-10-CM

## 2025-05-30 DIAGNOSIS — E78.5 HYPERLIPIDEMIA WITH TARGET LDL LESS THAN 130: ICD-10-CM

## 2025-06-06 ENCOUNTER — APPOINTMENT (OUTPATIENT)
Dept: PRIMARY CARE | Facility: CLINIC | Age: 55
End: 2025-06-06
Payer: COMMERCIAL

## 2025-06-06 VITALS
HEIGHT: 73 IN | TEMPERATURE: 97.7 F | HEART RATE: 96 BPM | OXYGEN SATURATION: 96 % | RESPIRATION RATE: 16 BRPM | DIASTOLIC BLOOD PRESSURE: 82 MMHG | BODY MASS INDEX: 30.69 KG/M2 | WEIGHT: 231.6 LBS | SYSTOLIC BLOOD PRESSURE: 136 MMHG

## 2025-06-06 DIAGNOSIS — I10 PRIMARY HYPERTENSION: ICD-10-CM

## 2025-06-06 DIAGNOSIS — E78.5 HYPERLIPIDEMIA WITH TARGET LDL LESS THAN 130: Primary | ICD-10-CM

## 2025-06-06 DIAGNOSIS — M17.12 OSTEOARTHRITIS OF LEFT KNEE, UNSPECIFIED OSTEOARTHRITIS TYPE: ICD-10-CM

## 2025-06-06 PROCEDURE — 3075F SYST BP GE 130 - 139MM HG: CPT | Performed by: FAMILY MEDICINE

## 2025-06-06 PROCEDURE — 3008F BODY MASS INDEX DOCD: CPT | Performed by: FAMILY MEDICINE

## 2025-06-06 PROCEDURE — 99214 OFFICE O/P EST MOD 30 MIN: CPT | Performed by: FAMILY MEDICINE

## 2025-06-06 PROCEDURE — 3079F DIAST BP 80-89 MM HG: CPT | Performed by: FAMILY MEDICINE

## 2025-06-06 RX ORDER — MELOXICAM 15 MG/1
15 TABLET ORAL DAILY
Qty: 90 TABLET | Refills: 3 | Status: SHIPPED | OUTPATIENT
Start: 2025-06-06

## 2025-06-06 RX ORDER — AMLODIPINE BESYLATE 5 MG/1
5 TABLET ORAL DAILY
Qty: 90 TABLET | Refills: 3 | Status: SHIPPED | OUTPATIENT
Start: 2025-06-06 | End: 2026-06-01

## 2025-06-06 ASSESSMENT — ENCOUNTER SYMPTOMS
OCCASIONAL FEELINGS OF UNSTEADINESS: 0
LOSS OF SENSATION IN FEET: 0
DEPRESSION: 0

## 2025-06-06 NOTE — PROGRESS NOTES
"Subjective    Patient ID: Rashaun Klein is a 54 y.o. male who presents for Hypertension, Hyperlipidemia, Osteoarthritis, and Obesity.   Hypertension: The patient is here for an evaluation of elevated blood pressure. The patient is trying to follow a low-salt diet. He is adherent to a low salt diet. Blood pressure is well controlled at home. The patient has not been hospitalized for this in the last 6 months. The patient is compliant with medications. Patient denies any side effects to the medications.     Hyperlipidemia: The patient is presenting today for a follow up of hyperlipidemia. The patient has not been hospitalized for this in the last 6 months. The patient is compliant with medications. Patient denies any side effects to the medications.     Osteoarthritis: The patient is presenting today with symptoms of osteoporosis. The patient has not been hospitalized for this in the last 6 months. The patient is compliant with medications. Patient denies any side effects to the medications. Patient got an injection in the left knee which was helpful. He know states his right hip is also doing better as well now. 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 He wears work boots daily.      Obesity:  The patient presents today for an evaluation of morbid obesity. The patient is continuously working on diet and exercise.     The patient denies having the following symptoms: chest pain, chest pressure, fever, chills, N/V/D, constipation, dizziness, headaches, SOB.    Objective   Vitals:  /82   Pulse 96   Temp 36.5 °C (97.7 °F)   Resp 16   Ht 1.854 m (6' 1\")   Wt 105 kg (231 lb 9.6 oz)   SpO2 96%   BMI 30.56 kg/m²     Physical Exam  Vitals reviewed.   Constitutional:       Appearance: Normal appearance.   Cardiovascular:      Rate and Rhythm: Normal rate and regular rhythm.      Pulses: Normal pulses.      Heart sounds: Normal heart sounds.   Pulmonary:      Effort: Pulmonary effort is normal. "      Breath sounds: Normal breath sounds.   Abdominal:      General: Abdomen is flat.      Palpations: Abdomen is soft.   Musculoskeletal:      Cervical back: Normal range of motion and neck supple.   Neurological:      Mental Status: He is alert.            Labs reviewed from :   01/31/2025    CMP, CBC, Lipid WNL.       Assessment/Plan   Problem List Items Addressed This Visit          Cardiac and Vasculature    Hyperlipidemia with target LDL less than 130 - Primary    This condition  is well controlled, continue with current medications.           Primary hypertension    This condition  is well controlled, continue with current medications.              Musculoskeletal and Injuries    Osteoarthritis of left knee    This condition  is well controlled, continue with current medications.          Blood labs ordered for today include magnesium, lipid panel, CMP, CBC.    Follow up in: 6 month(s) or sooner if needed with labs today.     Scribe Attestation  By signing my name below, Ida PRESLEY, Sherrell   attest that this documentation has been prepared under the direction and in the presence of Chaim Chow MD.   Chaim PRESLEY MD, personally performed the services described in the documentation as scribed by Ida Narayan in my presence and confirm that it is both accurate and complete.

## 2025-06-07 LAB
ALBUMIN SERPL-MCNC: 5.1 G/DL (ref 3.6–5.1)
ALP SERPL-CCNC: 91 U/L (ref 35–144)
ALT SERPL-CCNC: 24 U/L (ref 9–46)
ANION GAP SERPL CALCULATED.4IONS-SCNC: 10 MMOL/L (CALC) (ref 7–17)
AST SERPL-CCNC: 18 U/L (ref 10–35)
BASOPHILS # BLD AUTO: 18 CELLS/UL (ref 0–200)
BASOPHILS NFR BLD AUTO: 0.2 %
BILIRUB SERPL-MCNC: 0.9 MG/DL (ref 0.2–1.2)
BUN SERPL-MCNC: 14 MG/DL (ref 7–25)
CALCIUM SERPL-MCNC: 9.7 MG/DL (ref 8.6–10.3)
CHLORIDE SERPL-SCNC: 105 MMOL/L (ref 98–110)
CHOLEST SERPL-MCNC: 249 MG/DL
CHOLEST/HDLC SERPL: 4.5 (CALC)
CO2 SERPL-SCNC: 25 MMOL/L (ref 20–32)
CREAT SERPL-MCNC: 0.87 MG/DL (ref 0.7–1.3)
EGFRCR SERPLBLD CKD-EPI 2021: 102 ML/MIN/1.73M2
EOSINOPHIL # BLD AUTO: 320 CELLS/UL (ref 15–500)
EOSINOPHIL NFR BLD AUTO: 3.6 %
ERYTHROCYTE [DISTWIDTH] IN BLOOD BY AUTOMATED COUNT: 13 % (ref 11–15)
GLUCOSE SERPL-MCNC: 87 MG/DL (ref 65–99)
HCT VFR BLD AUTO: 42.9 % (ref 38.5–50)
HDLC SERPL-MCNC: 55 MG/DL
HGB BLD-MCNC: 14.6 G/DL (ref 13.2–17.1)
LDLC SERPL CALC-MCNC: 154 MG/DL (CALC)
LYMPHOCYTES # BLD AUTO: 1780 CELLS/UL (ref 850–3900)
LYMPHOCYTES NFR BLD AUTO: 20 %
MAGNESIUM SERPL-MCNC: 2.2 MG/DL (ref 1.5–2.5)
MCH RBC QN AUTO: 32.4 PG (ref 27–33)
MCHC RBC AUTO-ENTMCNC: 34 G/DL (ref 32–36)
MCV RBC AUTO: 95.3 FL (ref 80–100)
MONOCYTES # BLD AUTO: 1086 CELLS/UL (ref 200–950)
MONOCYTES NFR BLD AUTO: 12.2 %
NEUTROPHILS # BLD AUTO: 5696 CELLS/UL (ref 1500–7800)
NEUTROPHILS NFR BLD AUTO: 64 %
NONHDLC SERPL-MCNC: 194 MG/DL (CALC)
PLATELET # BLD AUTO: 319 THOUSAND/UL (ref 140–400)
PMV BLD REES-ECKER: 11.1 FL (ref 7.5–12.5)
POTASSIUM SERPL-SCNC: 4.6 MMOL/L (ref 3.5–5.3)
PROT SERPL-MCNC: 7.8 G/DL (ref 6.1–8.1)
RBC # BLD AUTO: 4.5 MILLION/UL (ref 4.2–5.8)
SODIUM SERPL-SCNC: 140 MMOL/L (ref 135–146)
TRIGL SERPL-MCNC: 235 MG/DL
WBC # BLD AUTO: 8.9 THOUSAND/UL (ref 3.8–10.8)

## 2025-06-09 ENCOUNTER — TELEPHONE (OUTPATIENT)
Dept: PRIMARY CARE | Facility: CLINIC | Age: 55
End: 2025-06-09
Payer: COMMERCIAL

## 2025-06-09 DIAGNOSIS — E78.2 MIXED HYPERLIPIDEMIA: Primary | ICD-10-CM

## 2025-06-09 NOTE — TELEPHONE ENCOUNTER
Patient was called to go over CBC , CMP, LIPID, and MAGNESIUM results. Patient understood the results and had no questions or concerns for the provider.     Patient would like to start on Cholesterol medication now as his mother had recently passed away due to high cholesterol.     Please send to : [facility] Mail order.

## 2025-06-11 PROBLEM — E78.2 MIXED HYPERLIPIDEMIA: Status: ACTIVE | Noted: 2025-06-11

## 2025-06-11 RX ORDER — ROSUVASTATIN CALCIUM 10 MG/1
10 TABLET, COATED ORAL DAILY
Qty: 90 TABLET | Refills: 3 | Status: SHIPPED | OUTPATIENT
Start: 2025-06-11 | End: 2026-06-06

## 2025-12-05 ENCOUNTER — APPOINTMENT (OUTPATIENT)
Dept: PRIMARY CARE | Facility: CLINIC | Age: 55
End: 2025-12-05
Payer: COMMERCIAL